# Patient Record
(demographics unavailable — no encounter records)

---

## 2025-03-18 NOTE — HISTORY OF PRESENT ILLNESS
[de-identified] : This is a 79 y/o woman who underwent a mastectomy on the right for a grade 2/3 , IDC er pos 50%, pr pos 50%, her2 pos FISH, revealing for a multicentric tumor - 1.2 cm and the other 1.7 cm, One sentinel node was positive and on oh dissection 11 additional nodes were negative.   Surgery was in 12/2012.  pT1c(x2)N1a.   She then received 6 cycles of neoadjuvant TCH, followed by 12 doses of Herceptin alone completing in ja 2014. Began femara in 8/2013.   Switched to Aromasin due to symptoms.   Patient has hx of osteoporosis for which she is on Zometa. She also has hx of neuropathy from chemo.  [de-identified] : Patient seen and examined today for follow up for the management of breast cancer.  She completed 10 years of endocrine therapy with exemestane 9/2023.  She is overdue for Zometa which was held at last visit due to recent ortho surgery for bone spur.  She continues to note lymphedema which is stable, wears compression stocking, not wearing today.  Denies fevers/chills, HA, dizziness, SOB, cough, CP, palpitations, abd pain, n/v/d, swelling to extremities, back pain, hematuria, BRBPR, abnormal vaginal bleeding. She complains of chronic nasal congestion- Has follow up with Dr. Dawkins.   Mammo: 3/2024 no evidence of malignancy, dense breasts  DEXA: 3/2024 L spine 1.6, R femur -2.2, R femoral neck -2.6, on Zometa, over due and will get today  GYN: Dr. Santos follows [facility]   Colonoscopy: 2022 Dr. Luna  PCP Dr. Karsten Bella  CT Chest 3/2024 no significant pulm nodule 6mm stable from previous

## 2025-03-18 NOTE — ASSESSMENT
[FreeTextEntry1] : Ms. Piedra is an 80 year old female with hx of pT1c(2) N1a IDC ER+ and HER2+ s/p TCH x6 and 12 doses of herceptin. On Aromasin and Zometa for osteoporosis.   #Breast Cancer  - pT1c(2) N1a IDC ER+ and HER2+  - s/p TCH x6 and 12 doses of herceptin - s/p L mastectomy with implant reconstruction  - On Aromasin  - s/p mammo/sono R breast 3/2023, no evidence of malignancy, dense breasts, reviewed, due 3/2024. Ordered.   - Lymphedema therapy. Has not gone. Has not been wearing sleeve. Encouraged.  - Discussed duration of antiestrogen at length, we will do 10 years given the patient is high risk due to oh positivity and HER-2 positive. Will complete 10 years 9/2023  - Continue to monitor tumor markers  - 3/28/24 vs and CBC reviewed; WBC 5.63, hgb 12.7, plt 303. As above patient completed 10 years AI use 9/2023 now on surveillance and has been doing well off it. s/p mammo/sono 3/2024 no evidence of malignancy, dense breast. Reviewed. Continue to monitor tumor markers  9/24/24 - vs and labs reviewed wbc, hgb and plts wnl, Na++ 134 - monitor, otherwise the rest of electrolytes wnl, kidney function and liver function wnl.  s/p mammo/sono 3/2024 no evidence of malignancy, dense breast. needs repeat 3/2025. Continue to monitor tumor markers. Dexa from 3/24 reviewed - osteoporosis of R Femoral neck - receiving zometa today  #vit d def  - Continue vit d  - Repeat labs today  #Osteoporosis   - s/p DEXA: 3/2024 L spine 1.6, R femur -2.2, R femoral neck -2.6, on Zometa, over due and will get today  - Continue to monitor DEXA q2y, due 3/2026 - Continue ca++, vit D and weight bearing exercises   #lymphedema  - Continue sleeve. Encouraged lymphedema therapy.  - OT/PT consult   #Lung Nodule  - s/p CT chest 3/2022 with stable 6mm lung nodules, no new nodules. Rec repeat q2y  - s/p CT Chest 3/2024 no significant pulm nodule 6mm stable from previous  repeat CT chest 3/2025 - ordered  #Health Maintenance  - Mammo: 3/2024 no evidence of malignancy, dense breasts  - DEXA: 3/2024 L spine 1.6, R femur -2.2, R femoral neck -2.6, on Zometa, over due and will get today  - GYN: Dr. Santos follows [facility]   - Colonoscopy: 2022 Dr. Luna  - PCP Dr. Karsten Bella  - CT Chest 3/2024 no significant pulm nodule 6mm stable from previous   RTC in 6 mos with CBC with diff, CMP, vit D, CEA, Ca 15.3, irons, B12 with Zometa

## 2025-03-18 NOTE — PHYSICAL EXAM
[Fully active, able to carry on all pre-disease performance without restriction] : Status 0 - Fully active, able to carry on all pre-disease performance without restriction [Normal] : affect appropriate [de-identified] : small movable cyst like soft tissue mass to the R elbow without erythema  [de-identified] : b/l breast symmetrical. s/p left mastectomy with implant reconstruction, no masses or abnormalities palpated no axillary adenopathy right breast is negative for any masses or lesions

## 2025-03-18 NOTE — REVIEW OF SYSTEMS
[Fatigue] : no fatigue [Shortness Of Breath] : shortness of breath [Wheezing] : no wheezing [Cough] : no cough [SOB on Exertion] : no shortness of breath during exertion [Joint Pain] : joint pain [Joint Stiffness] : joint stiffness [Anxiety] : anxiety [Negative] : Allergic/Immunologic [FreeTextEntry4] : chronic post nasal drip seeing ENT Dr. Clark -> had balloon inflation [FreeTextEntry6] : chronic post nasal drip seeing ENT Dr. Clark  [FreeTextEntry9] : s/p L hip replacement s/p bone spur operation had torn rotator cuff sees ortho  [de-identified] : +lymphedema not wearing compression sleeve today

## 2025-04-16 NOTE — REVIEW OF SYSTEMS
[Fatigue] : no fatigue [Wheezing] : no wheezing [Cough] : no cough [SOB on Exertion] : no shortness of breath during exertion [FreeTextEntry4] : chronic post nasal drip seeing ENT Dr. Clark -> had balloon inflation [FreeTextEntry6] : chronic post nasal drip seeing ENT Dr. Clark  [FreeTextEntry9] : limited mobiklity to R shoulder  [de-identified] : +lymphedema not wearing compression sleeve today

## 2025-04-16 NOTE — REVIEW OF SYSTEMS
[Fatigue] : no fatigue [Wheezing] : no wheezing [Cough] : no cough [SOB on Exertion] : no shortness of breath during exertion [FreeTextEntry4] : chronic post nasal drip seeing ENT Dr. Clark -> had balloon inflation [FreeTextEntry6] : chronic post nasal drip seeing ENT Dr. Clark  [FreeTextEntry9] : limited mobiklity to R shoulder  [de-identified] : +lymphedema not wearing compression sleeve today

## 2025-04-16 NOTE — HISTORY OF PRESENT ILLNESS
[de-identified] : This is a 79 y/o woman who underwent a mastectomy on the right for a grade 2/3 , IDC er pos 50%, pr pos 50%, her2 pos FISH, revealing for a multicentric tumor - 1.2 cm and the other 1.7 cm, One sentinel node was positive and on oh dissection 11 additional nodes were negative.   Surgery was in 12/2012.  pT1c(x2)N1a.   She then received 6 cycles of neoadjuvant TCH, followed by 12 doses of Herceptin alone completing in ja 2014. Began femara in 8/2013.   Switched to Aromasin due to symptoms.   Patient has hx of osteoporosis for which she is on Zometa. She also has hx of neuropathy from chemo.  [de-identified] : Patient seen and examined today for follow up for the management of breast cancer.  She completed 10 years of endocrine therapy with exemestane 9/2023. 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 She does not want any form of bisphosphonate. s/p fall 2/2025 fractured rib on R side. Has been seeing ortho due to R rotator cuff does not want surgery so has been getting cortisone injections. Has not seen lymphedema therapy in a while and still present. Had balloon procedure for sinuses with ENT. Denies fevers/chills, HA, dizziness, SOB, cough, CP, palpitations, abd pain, n/v/d, swelling to extremities, back pain, hematuria, BRBPR, abnormal vaginal bleeding.  Mammo: 3/2025 dense breasts no evidence of malignancy  DEXA: 3/2024 L spine 1.6, R femur -2.2, R femoral neck -2.6  GYN: Dr. Santos UTD  Colonoscopy: 2022 Dr. Luna  PCP Dr. Karsten Bella  CT Chest 3/2024 no significant pulm nodule 6mm stable from previous die not due for this year yet needs to schedule

## 2025-04-16 NOTE — PHYSICAL EXAM
[de-identified] : small movable cyst like soft tissue mass to the R elbow without erythema  [de-identified] : b/l breast symmetrical. s/p left mastectomy with implant reconstruction, no masses or abnormalities palpated no axillary adenopathy right breast is negative for any masses or lesions  [de-identified] : limited mobility to R shoulder joint, +lymphedema to L arm

## 2025-04-16 NOTE — ASSESSMENT
[FreeTextEntry1] : Ms. Piedra is an 80 year old female with hx of pT1c(2) N1a IDC ER+ and HER2+ s/p TCH x6 and 12 doses of herceptin. On Aromasin and Zometa for osteoporosis.   #Breast Cancer  - pT1c(2) N1a IDC ER+ and HER2+  - s/p TCH x6 and 12 doses of herceptin - s/p L mastectomy with implant reconstruction  - On Aromasin  - s/p mammo/sono R breast 3/2023, no evidence of malignancy, dense breasts, reviewed, due 3/2024. Ordered.   - Lymphedema therapy. Has not gone. Has not been wearing sleeve. Encouraged.  - Discussed duration of antiestrogen at length, we will do 10 years given the patient is high risk due to oh positivity and HER-2 positive. Will complete 10 years 9/2023  - Continue to monitor tumor markers  - 3/28/24 vs and CBC reviewed; WBC 5.63, hgb 12.7, plt 303. As above patient completed 10 years AI use 9/2023 now on surveillance and has been doing well off it. s/p mammo/sono 3/2024 no evidence of malignancy, dense breast. Reviewed. Continue to monitor tumor markers  9/24/24 - vs and labs reviewed wbc, hgb and plts wnl, Na++ 134 - monitor, otherwise the rest of electrolytes wnl, kidney function and liver function wnl.  s/p mammo/sono 3/2024 no evidence of malignancy, dense breast. needs repeat 3/2025. Continue to monitor tumor markers. Dexa from 3/24 reviewed - osteoporosis of R Femoral neck - receiving zometa today 4/15/25 vs and labs reviewed; s/p mammo 3/2025 negative. Does not wish to proceed with with any bisphophonate thearpy. 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 She understands risk of fracture. Educated. Will continue to monitor bone denisty   #vit d def  - Continue vit d  - Repeat labs today  #Osteoporosis   - s/p DEXA: 3/2024 L spine 1.6, R femur -2.2, R femoral neck -2.6, on Zometa - Continue to monitor DEXA q2y, due 3/2026 - Continue ca++, vit D and weight bearing exercises  - Does not wish to proceed with with any bisphophonate thearpy. 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 She understands risk of fracture. Educated. Will continue to monitor bone denisty   #lymphedema  - Continue sleeve. Encouraged lymphedema therapy.  - REferral entered   #Lung Nodule  - s/p CT chest 3/2022 with stable 6mm lung nodules, no new nodules. Rec repeat q2y  - s/p CT Chest 3/2024 no significant pulm nodule 6mm stable from previous  repeat CT chest 3/2025 - ordered did not do yet reminded   #Torn ROtator Cuff  - Does not want surgery  - Sees ortho for cortisone injections   #s/p fall  - fx R rib as above reviewed with OP her increased risk for fracture. Declines continuing with Zometa   #Health Maintenance  - Mammo: 3/2025 - DEXA: 3/2024 L spine 1.6, R femur -2.2, R femoral neck -2.6 - GYN: Dr. Santos follows  - Colonoscopy: 2022 Dr. Luna  - PCP Dr. Karsten Bella  - CT Chest 3/2024 no significant pulm nodule 6mm stable from previous due now   RTC in 6 mos with CBC with diff, CMP, vit D, CEA, Ca 15.3, irons, B12 and to review CT chest   Case and management discussed with Dr. Padilla

## 2025-04-16 NOTE — PHYSICAL EXAM
[de-identified] : small movable cyst like soft tissue mass to the R elbow without erythema  [de-identified] : b/l breast symmetrical. s/p left mastectomy with implant reconstruction, no masses or abnormalities palpated no axillary adenopathy right breast is negative for any masses or lesions  [de-identified] : limited mobility to R shoulder joint, +lymphedema to L arm

## 2025-04-16 NOTE — ASSESSMENT
[FreeTextEntry1] : Ms. Piedra is an 80 year old female with hx of pT1c(2) N1a IDC ER+ and HER2+ s/p TCH x6 and 12 doses of herceptin. On Aromasin and Zometa for osteoporosis.   #Breast Cancer  - pT1c(2) N1a IDC ER+ and HER2+  - s/p TCH x6 and 12 doses of herceptin - s/p L mastectomy with implant reconstruction  - On Aromasin  - s/p mammo/sono R breast 3/2023, no evidence of malignancy, dense breasts, reviewed, due 3/2024. Ordered.   - Lymphedema therapy. Has not gone. Has not been wearing sleeve. Encouraged.  - Discussed duration of antiestrogen at length, we will do 10 years given the patient is high risk due to oh positivity and HER-2 positive. Will complete 10 years 9/2023  - Continue to monitor tumor markers  - 3/28/24 vs and CBC reviewed; WBC 5.63, hgb 12.7, plt 303. As above patient completed 10 years AI use 9/2023 now on surveillance and has been doing well off it. s/p mammo/sono 3/2024 no evidence of malignancy, dense breast. Reviewed. Continue to monitor tumor markers  9/24/24 - vs and labs reviewed wbc, hgb and plts wnl, Na++ 134 - monitor, otherwise the rest of electrolytes wnl, kidney function and liver function wnl.  s/p mammo/sono 3/2024 no evidence of malignancy, dense breast. needs repeat 3/2025. Continue to monitor tumor markers. Dexa from 3/24 reviewed - osteoporosis of R Femoral neck - receiving zometa today 4/15/25 vs and labs reviewed; s/p mammo 3/2025 negative. Does not wish to proceed with with any bisphophonate thearpy. 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 She understands risk of fracture. Educated. Will continue to monitor bone denisty   #vit d def  - Continue vit d  - Repeat labs today  #Osteoporosis   - s/p DEXA: 3/2024 L spine 1.6, R femur -2.2, R femoral neck -2.6, on Zometa - Continue to monitor DEXA q2y, due 3/2026 - Continue ca++, vit D and weight bearing exercises  - Does not wish to proceed with with any bisphophonate thearpy. 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 She understands risk of fracture. Educated. Will continue to monitor bone denisty   #lymphedema  - Continue sleeve. Encouraged lymphedema therapy.  - REferral entered   #Lung Nodule  - s/p CT chest 3/2022 with stable 6mm lung nodules, no new nodules. Rec repeat q2y  - s/p CT Chest 3/2024 no significant pulm nodule 6mm stable from previous  repeat CT chest 3/2025 - ordered did not do yet reminded   #Torn ROtator Cuff  - Does not want surgery  - Sees ortho for cortisone injections   #s/p fall  - fx R rib as above reviewed with OP her increased risk for fracture. Declines continuing with Zometa   #Health Maintenance  - Mammo: 3/2025 - DEXA: 3/2024 L spine 1.6, R femur -2.2, R femoral neck -2.6 - GYN: Dr. Santos follows  - Colonoscopy: 2022 Dr. Luna  - PCP Dr. Karsten Bella  - CT Chest 3/2024 no significant pulm nodule 6mm stable from previous due now   RTC in 6 mos with CBC with diff, CMP, vit D, CEA, Ca 15.3, irons, B12 and to review CT chest   Case and management discussed with Dr. Padilla Chronic CHF

## 2025-04-16 NOTE — HISTORY OF PRESENT ILLNESS
[de-identified] : This is a 79 y/o woman who underwent a mastectomy on the right for a grade 2/3 , IDC er pos 50%, pr pos 50%, her2 pos FISH, revealing for a multicentric tumor - 1.2 cm and the other 1.7 cm, One sentinel node was positive and on oh dissection 11 additional nodes were negative.   Surgery was in 12/2012.  pT1c(x2)N1a.   She then received 6 cycles of neoadjuvant TCH, followed by 12 doses of Herceptin alone completing in ja 2014. Began femara in 8/2013.   Switched to Aromasin due to symptoms.   Patient has hx of osteoporosis for which she is on Zometa. She also has hx of neuropathy from chemo.  [de-identified] : Patient seen and examined today for follow up for the management of breast cancer.  She completed 10 years of endocrine therapy with exemestane 9/2023. 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 She does not want any form of bisphosphonate. s/p fall 2/2025 fractured rib on R side. Has been seeing ortho due to R rotator cuff does not want surgery so has been getting cortisone injections. Has not seen lymphedema therapy in a while and still present. Had balloon procedure for sinuses with ENT. Denies fevers/chills, HA, dizziness, SOB, cough, CP, palpitations, abd pain, n/v/d, swelling to extremities, back pain, hematuria, BRBPR, abnormal vaginal bleeding.  Mammo: 3/2025 dense breasts no evidence of malignancy  DEXA: 3/2024 L spine 1.6, R femur -2.2, R femoral neck -2.6  GYN: Dr. Santos UTD  Colonoscopy: 2022 Dr. Luna  PCP Dr. Karsten Bella  CT Chest 3/2024 no significant pulm nodule 6mm stable from previous die not due for this year yet needs to schedule